# Patient Record
(demographics unavailable — no encounter records)

---

## 2024-10-28 NOTE — PROCEDURE
[Hysteroscopy] : Hysteroscopy [Time out performed] : Pre-procedure time out performed.  Patient's name, date of birth and procedure confirmed. [Consent Obtained] : Consent obtained [Abnormal uterine bleeding] : abnormal uterine bleeding [Risks] : risks [Benefits] : benefits [Alternatives] : alternatives [Patient] : patient [Infection] : infection [Bleeding] : bleeding [Allergic Reaction] : allergic reaction [Lidocaine___ mL] : [unfilled] ~UmL of lidocaine [flexible] : Using aseptic technique a hysteroscopy was performed using a flexible hysteroscope [Sent to Pathology] : specimen was placed in buffered formalin and sent for pathology [Hemostasis obtained] : hemostasis obtained [Tolerated Well] : Patient tolerated the procedure well [Aftercare instructions/regstrictions given and follow-up scheduled] : Aftercare instructions/restrictions given and follow-up scheduled [Antibiotics given] : antibiotics not given [de-identified] : Under sterile condition and with paracervical block the cervix was dilated and endometrial sampling obtained and sent to pathology.  Hysteroscopic evaluation shows lush endometrium no polyps or myomas noted.

## 2024-11-25 NOTE — COUNSELING
[Nutrition/ Exercise/ Weight Management] : nutrition, exercise, weight management [Body Image] : body image [Vitamins/Supplements] : vitamins/supplements [Sunscreen] : sunscreen [Drugs] : drugs [Breast Self Exam] : breast self exam [Bladder Hygiene] : bladder hygiene [Confidentiality] : confidentiality - pt reports acne well controlled with [place] and aklief\\n- was previously prescribed topicals by dermatologist in [place] but needs to establish care here in [place] \\n- refills sent today for topicals\\n- RTC 4 weeks Detail Level: Detailed - pt reports dry and itchy skin \\n- onset June 2022 after moving here from [place] \\n- flaring intermittently over the legs, trunk, and arms \\n- suspect eczema based on exam \\n- disc r/b/sed of topical steroids \\n- pt opted to start Lauri’s regimen (HC 2.5%,  Mupirocin ointment, Vanicream 1:1:1) \\n- rxed today \\n- RTC 4 weeks

## 2024-11-25 NOTE — DISCUSSION/SUMMARY
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  Partner is in [place].  Patient is not ovulating so Clomid is being prescribed.  Risks benefits alternatives discussed.  Return in 4 weeks for follow-up.

## 2024-11-25 NOTE — HISTORY OF PRESENT ILLNESS
[FreeTextEntry1] : 30-year-old -0-2-0 presents for follow-up care.  Last menstrual cycle was October 10, 2024.  Hysterosalpingogram shows patency of 1 fallopian tube.  Partner has 1 child.  Partner lives in [place].

## 2024-11-25 NOTE — REVIEW OF SYSTEMS
Spoke with the patient who feels the steroid has helped some but 1-2 hours before her next pills she begins to cough quite a lot again. She is dealing with incontinence due to coughing so hard. She declined a fever and feels a little congested. Her cough is currently dry but does feel phlegm is stuck in her throat. She has not been taking any medications OTC.     She is wondering if she can be given a cough medication or something to help her with the ongoing cough or if she needs to be evaluated in the office. Informed her I would FU with the provider and get back to her with recommendations.    [Negative] : Heme/Lymph

## 2024-12-23 NOTE — DISCUSSION/SUMMARY
[FreeTextEntry1] : 31-year-old -0-2-0 presently on Clomid.  Has irregular cycle and will start testing for ovulation at the end of Clomid cycle.  Partner is still in [place].  Patient to return in 4 weeks, all questions answered.

## 2024-12-23 NOTE — HISTORY OF PRESENT ILLNESS
[FreeTextEntry1] : 31-year-old -0-2-0 last menstrual cycle was 2024 presently on Clomid.  Partner is still in [place].  She did not menstruate this time according to her but last menstrual cycle prior to this 1 was October 10 so her cycles are irregular.  Patient is advised to start testing at towards the end of the Clomid.

## 2025-01-21 NOTE — DISCUSSION/SUMMARY
[FreeTextEntry1] : 31-year-old -0-2-0 presently on Clomid.  Last menstrual cycle was 2025 and she is set up to get a progesterone level done around 2025.  Return to the office in 4 weeks for follow-up and continue with Clomid as prescribed.

## 2025-01-21 NOTE — HISTORY OF PRESENT ILLNESS
[FreeTextEntry1] : 31-year-old -0-2-0 last menstrual cycle was 2025 on Clomid to help with ovulation.  Partner is still in [place].  She is going to be checking for ovulation and few days after conclusion of the Clomid.

## 2025-03-20 NOTE — HISTORY OF PRESENT ILLNESS
[FreeTextEntry1] : 31-year-old -0-2-0 last menstrual cycle was 2025 presents for follow-up care.  Presently on Clomid 100 mg day 5-11 and progesterone level of 2025 was elevated measuring 12.80 meaning she ovulated cycle.  Partner is still in [place].

## 2025-05-01 NOTE — HISTORY OF PRESENT ILLNESS
[FreeTextEntry1] : 31-year-old -0-2-0 last menstrual cycle was 2025 presently on Clomid for ovulation and appears to be ovulating but partner is in [place].  No pain bleeding or discharge.  Will continue with Clomid for now and should the time, when is not working we will consider IVF.

## 2025-05-01 NOTE — PLAN
[FreeTextEntry1] : 31-year-old -0-2-0 presently on Clomid for ovulation and appears to be ovulating.  Hysterosalpingogram shows patent left fallopian tube with questionable patency of the right.  Partner is in [place] which may be the actual problem.  Patient to return in 4 weeks for follow-up and all the questions answered.

## 2025-05-29 NOTE — HISTORY OF PRESENT ILLNESS
[FreeTextEntry1] : 31-year-old -0-2-0 last menstrual cycle started May 27, 2025 till now.  She is ovulating on Clomid but her partner is in [place], she is going there in 2 weeks in the middle of her cycle hopefully she will be ovulating at that time and things could happen.  Partner has 1 child and has never gotten her pregnant.

## 2025-05-29 NOTE — DISCUSSION/SUMMARY
[FreeTextEntry1] : 31-year-old -0-2-0 presently on Clomid and ovulating on Clomid 100 mg p.o. days 5-11.  She is going to [place] in 2 weeks to meet with her partner.  Encouraged to continue with Clomid and prenatal vitamins.  Return in 4 weeks for follow-up.